# Patient Record
Sex: MALE | Race: WHITE
[De-identification: names, ages, dates, MRNs, and addresses within clinical notes are randomized per-mention and may not be internally consistent; named-entity substitution may affect disease eponyms.]

---

## 2021-02-24 ENCOUNTER — HOSPITAL ENCOUNTER (INPATIENT)
Dept: HOSPITAL 53 - M ED | Age: 36
LOS: 5 days | Discharge: HOME | DRG: 773 | End: 2021-03-01
Admitting: PSYCHIATRY & NEUROLOGY
Payer: SELF-PAY

## 2021-02-24 VITALS — HEIGHT: 66 IN | BODY MASS INDEX: 25.79 KG/M2 | WEIGHT: 160.5 LBS

## 2021-02-24 VITALS — SYSTOLIC BLOOD PRESSURE: 128 MMHG | DIASTOLIC BLOOD PRESSURE: 72 MMHG

## 2021-02-24 DIAGNOSIS — F10.10: ICD-10-CM

## 2021-02-24 DIAGNOSIS — F11.10: ICD-10-CM

## 2021-02-24 DIAGNOSIS — F14.10: ICD-10-CM

## 2021-02-24 DIAGNOSIS — F17.200: ICD-10-CM

## 2021-02-24 DIAGNOSIS — F15.10: ICD-10-CM

## 2021-02-24 DIAGNOSIS — F12.10: ICD-10-CM

## 2021-02-24 DIAGNOSIS — F19.94: Primary | ICD-10-CM

## 2021-02-24 LAB
ALBUMIN SERPL BCG-MCNC: 4.7 GM/DL (ref 3.2–5.2)
ALT SERPL W P-5'-P-CCNC: 38 U/L (ref 12–78)
AMPHETAMINES UR QL SCN: POSITIVE
APAP SERPL-MCNC: < 2 UG/ML (ref 10–30)
BARBITURATES UR QL SCN: NEGATIVE
BENZODIAZ UR QL SCN: NEGATIVE
BILIRUB CONJ SERPL-MCNC: 0.4 MG/DL (ref 0–0.2)
BILIRUB SERPL-MCNC: 1.4 MG/DL (ref 0.2–1)
BUN SERPL-MCNC: 22 MG/DL (ref 7–18)
BZE UR QL SCN: POSITIVE
CALCIUM SERPL-MCNC: 10.5 MG/DL (ref 8.5–10.1)
CANNABINOIDS UR QL SCN: POSITIVE
CHLAMYDIA DNA AMPLIFICATION: NEGATIVE
CHLORIDE SERPL-SCNC: 101 MEQ/L (ref 98–107)
CK SERPL-CCNC: 991 U/L (ref 39–308)
CO2 SERPL-SCNC: 23 MEQ/L (ref 21–32)
CREAT SERPL-MCNC: 1.25 MG/DL (ref 0.7–1.3)
ETHANOL SERPL-MCNC: < 0.003 % (ref 0–0.01)
GFR SERPL CREATININE-BSD FRML MDRD: > 60 ML/MIN/{1.73_M2} (ref 60–?)
GLUCOSE SERPL-MCNC: 72 MG/DL (ref 70–100)
HBV CORE IGM SER QL: NEGATIVE
HBV SURFACE AB SER-ACNC: NEGATIVE M[IU]/ML
HCT VFR BLD AUTO: 45.7 % (ref 42–52)
HCV AB SER QL: < 0 INDEX (ref ?–0.8)
HEPATITIS A ANTIBODY IGM: NEGATIVE
HGB BLD-MCNC: 15.2 G/DL (ref 13.5–17.5)
HIV 1+2 AB+HIV1 P24 AG SERPL QL IA: NEGATIVE
MCH RBC QN AUTO: 29.3 PG (ref 27–33)
MCHC RBC AUTO-ENTMCNC: 33.3 G/DL (ref 32–36.5)
MCV RBC AUTO: 88.1 FL (ref 80–96)
METHADONE UR QL SCN: NEGATIVE
N GONORRHOEA RRNA SPEC QL NAA+PROBE: NEGATIVE
OPIATES UR QL SCN: NEGATIVE
PCP UR QL SCN: NEGATIVE
PLATELET # BLD AUTO: 333 10^3/UL (ref 150–450)
POTASSIUM SERPL-SCNC: 4.9 MEQ/L (ref 3.5–5.1)
PROT SERPL-MCNC: 7.9 GM/DL (ref 6.4–8.2)
RBC # BLD AUTO: 5.19 10^6/UL (ref 4.3–6.1)
SALICYLATES SERPL-MCNC: 4.8 MG/DL (ref 5–30)
SODIUM SERPL-SCNC: 136 MEQ/L (ref 136–145)
TSH SERPL DL<=0.005 MIU/L-ACNC: 1.97 UIU/ML (ref 0.36–3.74)
WBC # BLD AUTO: 12.1 10^3/UL (ref 4–10)

## 2021-02-24 NOTE — CCD
Summarization Of Episode

                             Created on: 2021



TAMARJAVY CRAIG

External Reference #: [id number]

: 1985

Sex: Male



Demographics





                          Address                   [address] 

[place]  [address]

 

                          Home Phone                [phone]

 

                          Preferred Language        English

 

                          Marital Status            Single

 

                          Mandaeism Affiliation     Confucianist

 

                          Race                      White

 

                          Ethnic Group              Not  or 





Author





                          Author                    [facility]

 

                          Organization              [facility]

 

                          Address                   Unknown

 

                          Phone                     Unavailable







Support





                Name            Relationship    Address         Phone

 

                    NAMAN BOJORQUEZ          Next Of Kin         [address]

[place]  [address]                      [phone]

 

                    [facility] Next Of Kin         [address] [place]  [address]                    [phone]

 

                UNEMPLOYED      Next Of Kin     Unknown         [phone]

 

                UE              Next Of Kin     Unknown         Unavailable

 

                    ELEAZAR BRISCOE     Next Of Kin         [address]

[place]  [address]                        [phone]

 

                    ORVILLEELEAZAR FIGUEROA    Next Of Kin         [address]

[place]  [address]                        [phone]



                                  



Re-disclosure Warning

          The records that you are about to access may contain information from 
federally-assisted alcohol or drug abuse programs. If such information is 
present, then the following federally mandated warning applies: This information
has been disclosed to you from records protected by federal confidentiality 
rules (42 CFR part 2). The federal rules prohibit you from making any further 
disclosure of this information unless further disclosure is expressly permitted 
by the written consent of the person to whom it pertains or as otherwise 
permitted by 42 CFR part 2. A general authorization for the release of medical 
or other information is NOT sufficient for this purpose. The Federal rules 
restrict any use of the information to criminally investigate or prosecute any 
alcohol or drug abuse patient.The records that you are about to access may 
contain highly sensitive health information, the redisclosure of which is 
protected by Article 27-F of the [facility] Public Health law. If you 
continue you may have access to information: Regarding HIV / AIDS; Provided by 
facilities licensed or operated by the [facility] Office of Mental Health; 
or Provided by the [facility] Office for People With Developmental 
Disabilities. If such information is present, then the following [place] 
mandated warning applies: This information has been disclosed to you from 
confidential records which are protected by state law. State law prohibits you 
from making any further disclosure of this information without the specific 
written consent of the person to whom it pertains, or as otherwise permitted by 
law. Any unauthorized further disclosure in violation of state law may result in
a fine or care home sentence or both. A general authorization for the release of 
medical or other information is NOT sufficient authorization for further disc
losure.                                                          



Insurance Providers

          



             Payer name   Policy type / Coverage type Policy ID    Covered party

 ID Covered 

party's relationship to mcguire Policy Mcguire             Plan Information

 

          Pending sale to [facility]             [id number]                             [id number]

700

 

          [facility]26339E                              [id number]

 

          MEDICAID [id number]            [id number]

 

          TOTAL CARE [facility]           [id number]            SP                  [id number]

293

 

          MEDICAID            [id number]            [id number]

 

          Pending sale to [facility]             [id number]                             [id number]

 

          MEDICAID [id number]            [facility]26339E

 

          TOTAL CARE MEDICAID [id number]            [facility]26339E

 

          [facility]                      [place]                 



 

          SELF PAY  2         UNAVAILABLE                               UNAVAILA

BLE

 

          SELF PAY  2         UNAVAILABLE           1                   UNAVAILA

BLE

 

          [facility]         [id number]              1                   11

1213

 

          SELF PAY  2         UNAVAILABLE                               UNAVAILA

BLE

## 2021-02-24 NOTE — CCD
Summarization Of Episode

                             Created on: 2021



TAMARJAVY CRAIG

External Reference #: [id number]

: 1985

Sex: Male



Demographics





                          Address                   [address] 

[place]  [address]

 

                          Home Phone                [phone]

 

                          Preferred Language        English

 

                          Marital Status            Single

 

                          Catholic Affiliation     Jew

 

                          Race                      White

 

                          Ethnic Group              Not  or 





Author





                          Author                    [facility]

 

                          Organization              [facility]

 

                          Address                   Unknown

 

                          Phone                     Unavailable







Support





                Name            Relationship    Address         Phone

 

                    NAMAN BOJORQUEZ          Next Of Kin         [address]

[place]  [address]                      [phone]

 

                    [facility] Next Of Kin         [address] [place]  [address]                    [phone]

 

                UNEMPLOYED      Next Of Kin     Unknown         [phone]

 

                UE              Next Of Kin     Unknown         Unavailable

 

                    ELEAZAR BRISCOE     Next Of Kin         [address]

[place]  [address]                        [phone]

 

                    ORVILLEELEAZAR FIGUEROA    Next Of Kin         [address]

[place]  [address]                        [phone]



                                  



Re-disclosure Warning

          The records that you are about to access may contain information from 
federally-assisted alcohol or drug abuse programs. If such information is 
present, then the following federally mandated warning applies: This information
has been disclosed to you from records protected by federal confidentiality 
rules (42 CFR part 2). The federal rules prohibit you from making any further 
disclosure of this information unless further disclosure is expressly permitted 
by the written consent of the person to whom it pertains or as otherwise 
permitted by 42 CFR part 2. A general authorization for the release of medical 
or other information is NOT sufficient for this purpose. The Federal rules 
restrict any use of the information to criminally investigate or prosecute any 
alcohol or drug abuse patient.The records that you are about to access may 
contain highly sensitive health information, the redisclosure of which is 
protected by Article 27-F of the [facility] Public Health law. If you 
continue you may have access to information: Regarding HIV / AIDS; Provided by 
facilities licensed or operated by the [facility] Office of Mental Health; 
or Provided by the [facility] Office for People With Developmental 
Disabilities. If such information is present, then the following [place] 
mandated warning applies: This information has been disclosed to you from 
confidential records which are protected by state law. State law prohibits you 
from making any further disclosure of this information without the specific 
written consent of the person to whom it pertains, or as otherwise permitted by 
law. Any unauthorized further disclosure in violation of state law may result in
a fine or retirement sentence or both. A general authorization for the release of 
medical or other information is NOT sufficient authorization for further disc
losure.                                                          



Insurance Providers

          



             Payer name   Policy type / Coverage type Policy ID    Covered party

 ID Covered 

party's relationship to mcguire Policy Mcguire             Plan Information

 

          SELF PAY ONLY           [id number]           SP                  [id number]

288

 

          [facility]             [id number]           SP                  [id number]

700

 

          [facility]26339E                              [id number]

 

          MEDICAID [id number]            [id number]

 

          TOTAL CARE [facility]           [id number]            SP                  [id number]

293

 

          MEDICAID [id number]            [facility]26339E

 

          [facility]             [id number]                             [id number]

 

          MEDICAID [id number]            [id number]

 

          TOTAL CARE MEDICAID [id number]            [facility]26339E

 

          [facility]                      [place]                 



 

          SELF PAY  2         UNAVAILABLE                               UNAVAILA

BLE

 

          SELF PAY  2         UNAVAILABLE           1                   UNAVAILA

BLE

 

          [facility]         [id number]              1                   11

1213

 

          SELF PAY  2         UNAVAILABLE                               UNAVAILA

BLE

## 2021-02-24 NOTE — CCD
Summarization Of Episode

                             Created on: 2021



TAMARJAVY CRAIG

External Reference #: [id number]

: 1985

Sex: Male



Demographics





                          Address                   [address] 

[place]  [address]

 

                          Home Phone                [phone]

 

                          Preferred Language        English

 

                          Marital Status            Single

 

                          Anabaptism Affiliation     Methodist

 

                          Race                      White

 

                          Ethnic Group              Not  or 





Author





                          Author                    [facility]

 

                          Organization              [facility]

 

                          Address                   Unknown

 

                          Phone                     Unavailable







Support





                Name            Relationship    Address         Phone

 

                    NAMAN BOJORQUEZ          Next Of Kin         [address]

[place]  [address]                      [phone]

 

                    [facility] Next Of Kin         [address] [place]  [address]                    [phone]

 

                UNEMPLOYED      Next Of Kin     Unknown         [phone]

 

                UE              Next Of Kin     Unknown         Unavailable

 

                    ELEAZAR BRISCOE     Next Of Kin         [address]

[place]  [address]                        [phone]

 

                    ORVILLEELEAZAR FIGUEROA    Next Of Kin         [address]

[place]  [address]                        [phone]



                                  



Re-disclosure Warning

          The records that you are about to access may contain information from 
federally-assisted alcohol or drug abuse programs. If such information is 
present, then the following federally mandated warning applies: This information
has been disclosed to you from records protected by federal confidentiality 
rules (42 CFR part 2). The federal rules prohibit you from making any further 
disclosure of this information unless further disclosure is expressly permitted 
by the written consent of the person to whom it pertains or as otherwise 
permitted by 42 CFR part 2. A general authorization for the release of medical 
or other information is NOT sufficient for this purpose. The Federal rules 
restrict any use of the information to criminally investigate or prosecute any 
alcohol or drug abuse patient.The records that you are about to access may 
contain highly sensitive health information, the redisclosure of which is 
protected by Article 27-F of the [facility] Public Health law. If you 
continue you may have access to information: Regarding HIV / AIDS; Provided by 
facilities licensed or operated by the [facility] Office of Mental Health; 
or Provided by the [facility] Office for People With Developmental 
Disabilities. If such information is present, then the following [place] 
mandated warning applies: This information has been disclosed to you from 
confidential records which are protected by state law. State law prohibits you 
from making any further disclosure of this information without the specific 
written consent of the person to whom it pertains, or as otherwise permitted by 
law. Any unauthorized further disclosure in violation of state law may result in
a fine or detention sentence or both. A general authorization for the release of 
medical or other information is NOT sufficient authorization for further disc
losure.                                                          



Insurance Providers

          



             Payer name   Policy type / Coverage type Policy ID    Covered party

 ID Covered 

party's relationship to mcguire Policy Mcguire             Plan Information

 

          SELF PAY ONLY           [id number]           SP                  [id number]

288

 

          [facility]             [id number]           SP                  [id number]

700

 

          [facility]26339E                              [id number]

 

          MEDICAID [id number]            [id number]

 

          TOTAL CARE [facility]           [id number]            SP                  [id number]

293

 

          MEDICAID [id number]            [facility]26339E

 

          [facility]             [id number]                             [id number]

 

          MEDICAID [id number]            [facility]26339E

 

          TOTAL CARE MEDICAID [id number]            [id number]

 

          [facility]                      [place]                 



 

          SELF PAY  2         UNAVAILABLE                               UNAVAILA

BLE

 

          SELF PAY  2         UNAVAILABLE           1                   UNAVAILA

BLE

 

          [facility]         [id number]              1                   11

1213

 

          SELF PAY  2         UNAVAILABLE                               UNAVAILA

BLE

## 2021-02-24 NOTE — CCD
Summarization Of Episode

                             Created on: 2021



TAMARJAVY CRAIG

External Reference #: [id number]

: 1985

Sex: Male



Demographics





                          Address                   [address] [place]  [address]

 

                          Home Phone                [phone]

 

                          Preferred Language        English

 

                          Marital Status            Single

 

                          Buddhism Affiliation     Gnosticist

 

                          Race                      Other Race

 

                          Ethnic Group              Not  or 





Author





                          Author                    [facility]

 

                          Organization              [facility]

 

                          Address                   Unknown

 

                          Phone                     Unavailable







Support





                Name            Relationship    Address         Phone

 

                    [facility] Next Of Kin         [address] [place]  [address]                    [phone]

 

                UNEMPLOYED      Next Of Kin     Unknown         [phone]

 

                UE              Next Of Kin     Unknown         Unavailable

 

                    RACHNAELEAZAR JANSEN     Next Of Kin         [address]

[place]  [address]                        [phone]

 

                    ORVILLEELEAZAR FIGUEROA    Next Of Kin         [address]

[place]  [address]                        [phone]



                                  



Re-disclosure Warning




Insurance Providers

          



             Payer name   Policy type / Coverage type Policy ID    Covered party

 ID Covered 

party's relationship to doyle Policy Doyle             Plan Information

 

          [facility]             [id number]           SP                  [id number]

700

 

          [facility]26339E                              [id number]

 

          MEDICAID [id number]            [id number]

 

          TOTAL [facility]           [id number]            SP                  [id number]

293

 

          MEDICAID            [id number]                              [id number]

 

          [facility]             [id number]                             [id number]

 

          MEDICAID [id number]            [facility]                 [id number]

 

          TOTAL CARE MEDICAID [id number]            [id number]

 

          [facility]                      [place]                 



 

          SELF PAY  2         UNAVAILABLE                               UNAVAILA

BLE

 

          SELF PAY  2         UNAVAILABLE           1                   UNAVAILA

BLE

 

          [facility]         [id number]              1                   11

1213

 

          SELF PAY  2         UNAVAILABLE                               UNAVAILA

BLE

## 2021-02-25 VITALS — DIASTOLIC BLOOD PRESSURE: 69 MMHG | SYSTOLIC BLOOD PRESSURE: 129 MMHG

## 2021-02-25 VITALS — DIASTOLIC BLOOD PRESSURE: 86 MMHG | SYSTOLIC BLOOD PRESSURE: 140 MMHG

## 2021-02-25 LAB
ALBUMIN SERPL BCG-MCNC: 4.1 GM/DL (ref 3.2–5.2)
ALT SERPL W P-5'-P-CCNC: 38 U/L (ref 12–78)
BILIRUB SERPL-MCNC: 0.7 MG/DL (ref 0.2–1)
BUN SERPL-MCNC: 20 MG/DL (ref 7–18)
CALCIUM SERPL-MCNC: 9.2 MG/DL (ref 8.5–10.1)
CHLORIDE SERPL-SCNC: 106 MEQ/L (ref 98–107)
CK SERPL-CCNC: 226 U/L (ref 39–308)
CO2 SERPL-SCNC: 27 MEQ/L (ref 21–32)
CREAT SERPL-MCNC: 1.12 MG/DL (ref 0.7–1.3)
GFR SERPL CREATININE-BSD FRML MDRD: > 60 ML/MIN/{1.73_M2} (ref 60–?)
GLUCOSE SERPL-MCNC: 108 MG/DL (ref 70–100)
POTASSIUM SERPL-SCNC: 4.3 MEQ/L (ref 3.5–5.1)
PROT SERPL-MCNC: 6.7 GM/DL (ref 6.4–8.2)
SODIUM SERPL-SCNC: 140 MEQ/L (ref 136–145)

## 2021-02-25 RX ADMIN — MULTIPLE VITAMINS W/ MINERALS TAB SCH TAB: TAB at 15:03

## 2021-02-25 RX ADMIN — Medication SCH MG: at 15:03

## 2021-02-25 RX ADMIN — IBUPROFEN PRN MG: 400 TABLET, FILM COATED ORAL at 09:10

## 2021-02-25 RX ADMIN — NICOTINE SCH PATCH: 21 PATCH, EXTENDED RELEASE TRANSDERMAL at 15:03

## 2021-02-25 RX ADMIN — FOLIC ACID SCH MG: 1 TABLET ORAL at 15:02

## 2021-02-25 RX ADMIN — Medication SCH MG: at 21:06

## 2021-02-25 NOTE — MHHPEPDOC
General


Date Of Admission:  Feb 25, 2021


Legal Status:  9.39


Chief Complaint


People are whispering about me. People may want to push me off the roof





History of Present Illness


HISTORY OF THE PRESENT ILLNESS: Patient is a 35 -year-old , male, who 

states that people are whispering about him and may want to push him off the 

roof. Patient has a long history of substance abuse. Patient has been in and out

of [facility] for over 15 years for numerous charges. States that when he applies 

for interviews of people look him up in terms of his record and he is unable to 

hold a job. He states an past other employees want to throw him off the roof. He

states there are cars chasing him. He states he was at a friend's house and was 

asked to leave because of things they were saying about me.. He states he has 

had poor sleep and racing thoughts. He states he has had no outpatient care for 

2 years. He is presently working in [employer]. He states he has had numerous 

charges against him, including probation violations, burglary DWIs, drug use. 

Not reporting for probation. He has used cocaine, crack, heroin, and jovi.. He 

states he has had no previous psychiatric hospitalizations, but has had 

outpatient care, but he does not know his therapist. He states in the past. He 

has been on Seroquel, Remeron and Vistaril but is not on any medications now. 

His surgical history is negative. His most recent legal problem has been driving

without a license and he has a court date on March 8. He has not been  

but has a 19-year-old son he has never seen.. He has a GED. He states he has 

suicidal thoughts but has never made suicidal attempts and is concerned about 

having sexually transmitted diseases





Psychiatric Review of Systems


Depression (2 or more weeks):  depressed mood, appetite changes, suicidal 

thoughts


Psychosis:  auditory hallucination


PTSD:  denies


Anxiety:  denies


Anxiety/ 6 months or more of:  sleep disturbance





Past Psychiatric History


Previous Psychiatric Diagnosis: Patient has been treated with Seroquel Remeron 

and Vistaril. Diagnosis unknown.


Previous Psychiatric Admissions: No previous admissions.


Suicide Attempts:. States no previous suicide attempts.


Psychiatric Follow-up:. Has had follow-up appointments, but unclear if patient 

has been compliant.


Psychiatric medications:. No recent medications, but past use of Vistaril, 

Remeron and Seroquel.





Past Medical History


Medical Problems


No recent medical problem


Head Injury:  No


Seizures:  No


Hospitalizations:  No


Surgeries:  No





Family Medical/Psychiatric HX


Medical Problems


Family history not yet clarified


Psychiatric Disorders:  No


Addiction:  No


Suicide Attemps/Completions:  No





Addiction History


alcohol, cocaine, ecstasy, amphetamines, opioids, methamphetamines, heroin





Social History


Childhood: No information.


Abuse/Trauma:. No information.


Current Living Situation: Lives with his mother and occasionally his employer.


Education: GED.


Employment: [employer].


Social Support:. Mother.


Legal: Extensive legal history with most recent for driving without license.


Marital:, Not .





Mental Status Examination


General Appearance:  unkempt


Build:  average


Demeanor:  average


Eye Contact:  average


Activity:  average


Behavior:  cooperative


Speech:  clear


Mood:  depressed


Mood


Depressed and fearful


Thought Process:  depressed, slow


Thought Content (Delusions):  persecutory, paranoia, delusions


Thought Content (Other):  internal-stimuli


Thought Content (Aggressive):  none reported


Perception (Hallucinations):  auditory, visual


Perception (Other):  none reported


Cognition (Impairment of):  none reported


Cognition(Intelligence Est.):  average


Oriented:  Awake, Alert, Oriented times three


Insight:  poor


Judgment:  Poor


Psychosis:  Psychotic Perceptions





Diagnoses


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





A-FIB/CHADSVASC


A-FIB History


Current/History of A-Fib/PAF?:  No


Current PO Anticoag Therapy:  No





Age/Risk Factor Scoring


CHADSVASC:  








CHADSVASC Response (Comments) Value


 


Age Risk Factor Age < 65 years old 0


 


Gender Risk Factor Male 0


 


Hx of CHF No 0


 


Hx of HTN No 0


 


Hx of Stroke/TIA/or VTE No 0


 


Hx of Diabetes No 0


 


Hx of Vascular Disease No 0


 


Total  0











Treatment


Treatment ordered:  NONE





Initial Treatment Plan


1. Patient was admitted on a [9.39] status.


2. Complete history was obtained.


3. With patients permission, family will be contacted and database will be 

expanded. 


4. Patients medication regimen will be reviewed and changed accordingly. 


5. Patient will be provided with protected environment. 


6. Patient will be treated with individual, group, and milieu therapies. 


7. Patient will receive supportive psych-education.


8. Discharge planning will commence immediately.


9. Outpatient follow-up treatment will be strongly recommended.


10. The initial treatment plan will focus initially on:


* Depression.


* Risk for suicide.





ESTIMATED LENGTH OF STAY: - DAYS.





TIME SPENT COUNSELING AND COORDINATING INITIAL CARE:  minutes.





Vital Signs





Vital Signs








  Date Time  Temp Pulse Resp B/P (MAP) Pulse Ox O2 Delivery O2 Flow Rate FiO2


 


2/25/21 06:03 97.9 61 18 129/69 (89) 98 Room Air  











Laboratory Data


24H Labs


Laboratory Tests 2


2/24/21 13:14: Coronavirus (COVID-19)(PCR) NEGATIVE





Medications


No Active Prescriptions or Reported Meds





Allergies


Coded Allergies:  


     No Known Allergies (Unverified , 2/24/21)











AVA OCHOA MD            Feb 25, 2021 10:55

## 2021-02-25 NOTE — HPEPDOC
[facility] Medical History & Physical


Date of Admission


Feb 24, 2021


Date of Service:  Feb 25, 2021


Attending Physician:  Minna Phelps MD





History and Physical


MEDICAL H&P





HISTORY OF PRESENT ILLNESS: 


Patient is a 35-year-old male with PMH of polysubstance abuse (amphetamines, 

cocaine, cannabinoids, opiates, alcohol) , ADHD, depression, bipolar disorder 

who was admitted to [facility] of [facility] on 02/25/2021 with the chief diagnosis of psychotic 

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 The patient presented to the emergency 

room complaining of increased paranoia, auditory hallucinations which have been 

going on for 2 years. The patient states he's been using drugs to help calm 

these hallucinations but this has only made his situation worse. He has 

difficulty concentrating, insomnia, poor appetite, decreased energy and feels 

hopeless. He denied suicidal or homicidal ideation or self-harm. He states he 

currently has multiple life stressors, has been in and out of [facility] over the past

several years and has trouble with substance abuse. He states he does not have a

good support system within his [place] of friends who also use drugs. He used to 

follow with behavioral health as outpatient but he is not in some time. He also 

does not have a primary care provider to follow up with in the community. UDS 

was positive for amphetamines, cocaine and cannabinoids. Creatinine was within 

normal limits. Approaching the higher limits of normal, CK elevated at 991. 

There was no documented seizures prior to coming to the ER. The patient was 

admitted under [facility] for psychotic disorder secondary to substance abuse.





Medicine team was consulted on 02/25/2021 to further evaluate. The patient was 

borderline tearful in telling me his issues as listed above. CK improved and 

creatinine was within normal limits. Bili was also improved compared to 

admission. He admits to having at times muscle aches but denies chest pains, 

shyness of breath, lightheadedness, dizziness, headaches. He claims to feel 

better since being in the hospital. He was a big concerned as a sexual partner 

told him recently to be "checked" for STD due to positive test on her end. All 

STDs thus far are neg here. He denies dysuria, rashes, hematuria, polyuria. 





REVIEW OF SYSTEMS: Neg except for what is mentioned above 





PAST MEDICAL HISTORY: 


Polysubstance abuse (amphetamines, cocaine, cannabinoids, opiates, alcohol) 


ADHD


depression


bipolar disorder





PAST SURGICAL HISTORY: 


none 





FAMILY HISTORY: 


Father: Does not know biological father 


Mother: emphysema. Alive 





SOCIAL HISTORY: 


Smokes 1 pack per day and has done so for 20 years. He also admits to smoking 

marijuana. He also is a 20 year history of drug abuse with the aforementioned 

drugs. He takes alcohol 612 beers per night but not consistently. Lives at 

various residences as he does not have a permanent residence of his own. He is a

full code





ALLERGIES: 


Please see below. 





HOME MEDICATIONS: 


Please see below.





PHYSICAL EXAMINATION: 


VS: Please see below 


CONSTITUTIONAL: No acute distress,  AAO x 3


EYES: PERRLA, EOM intact


HENT, MOUTH: Normocephalic, atraumatic, moist mucous membranes, 


NECK: SUPPLE, no JVD, no lymphadenopathy, no carotid bruit


CV: Regular rate and rhythm, S1S2 normal, no murmurs/rubs/gallops


RESPIRATORY: Clear to auscultation bilaterally, no rales/rhonchi/wheezes


GI:  BS positive in 4 quadrants, soft, nontender, nondistended, no rebound or 

guarding, no organomegaly


: Deferred


MUSCULOSKELETAL: Normal ROM. No cyanosis, clubbing, swelling, joint deformity, 

extremity edema


INTEGUMENTARY:  Multiple tattoos on body, intact, no rashes, no lesions, no 

erythema


NEUROLOGIC: Cranial Nerves II-XII are intact, no focal deficits


PSYCHIATRIC: Slightly agitated mood 





LABORATORY DATA: 


Please see below 





IMAGING: 


None 





ASSESSMENT: 35-year-old male with PMH of polysubstance abuse (amphetamines, 

cocaine, cannabinoids, opiates, alcohol) , ADHD, depression, bipolar disorder 

admitted to [facility] for psychotic disorder 2/2 to substance abuse. 





PLAN:  





Psychotic disorder 2/2 to polysubstance abuse


-Plan per psychiatry 





Hx of depression / ADHD / bipolar disorder


-Plan per psych 





Elevated CK


-Cr wnl, CK improving


-Encourage fluids Q2H while awake 





Tobacco use 


-Nicotine patch 





Alcohol abuse


-No s/s of withdrawl 


-CIWA protocol started 








DISPOSITION: Thank you kindly for this consult. At this will sign off on patient

but if needed again, please do not hesitate to call us at any time.





Vital Signs





Vital Signs








  Date Time  Temp Pulse Resp B/P (MAP) Pulse Ox O2 Delivery O2 Flow Rate FiO2


 


2/25/21 06:03 97.9 61 18 129/69 (89) 98 Room Air  











Laboratory Data


Labs 24H


Laboratory Tests 2


2/25/21 10:46: 


Anion Gap 7L, Glomerular Filtration Rate > 60.0, Calcium Level 9.2, Total 

Bilirubin 0.7, Aspartate Amino Transf (AST/SGOT) 31, Alanine Aminotransferase 

(ALT/SGPT) 38, Alkaline Phosphatase 104, Total Creatine Kinase 226#, Total 

Protein 6.7, Albumin 4.1, Albumin/Globulin Ratio 1.6


CBC/BMP


Laboratory Tests


2/25/21 10:46











Home Medications


No Active Prescriptions or Reported Meds





Allergies


Coded Allergies:  


     No Known Allergies (Unverified , 2/24/21)





A-FIB/CHADSVASC


A-FIB History


Current/History of A-Fib/PAF?:  No


Current PO Anticoag Therapy:  No





Age/Risk Factor Scoring


CHADSVASC:  








CHADSVASC Response (Comments) Value


 


Age Risk Factor Age < 65 years old 0


 


Gender Risk Factor Male 0


 


Hx of CHF No 0


 


Hx of HTN No 0


 


Hx of Stroke/TIA/or VTE No 0


 


Hx of Diabetes No 0


 


Hx of Vascular Disease No 0


 


Total  0











Treatment


Treatment ordered:  NONE


Other anticoagulant ordered:  none











Minna Phelps MD            Feb 25, 2021 14:27

## 2021-02-26 VITALS — SYSTOLIC BLOOD PRESSURE: 104 MMHG | DIASTOLIC BLOOD PRESSURE: 58 MMHG

## 2021-02-26 VITALS — DIASTOLIC BLOOD PRESSURE: 75 MMHG | SYSTOLIC BLOOD PRESSURE: 141 MMHG

## 2021-02-26 RX ADMIN — NICOTINE SCH PATCH: 21 PATCH, EXTENDED RELEASE TRANSDERMAL at 09:30

## 2021-02-26 RX ADMIN — Medication SCH MG: at 20:37

## 2021-02-26 RX ADMIN — Medication SCH MG: at 09:29

## 2021-02-26 RX ADMIN — MULTIPLE VITAMINS W/ MINERALS TAB SCH TAB: TAB at 09:31

## 2021-02-26 RX ADMIN — FOLIC ACID SCH MG: 1 TABLET ORAL at 09:29

## 2021-02-26 RX ADMIN — IBUPROFEN PRN MG: 400 TABLET, FILM COATED ORAL at 09:29

## 2021-02-26 NOTE — MHIPNPDOC
[facility] Progress Note


Progress Note


DATE OF SERVICE: 2/26/21





HISTORY: 35-year-old male with extensive drug use was admitted in paranoid 

psychotic state.





VITAL SIGNS: See below.





NEW TEST RESULTS:, Non-.





CURRENT MEDICATIONS: See below.





MENTAL STATUS EXAMINATION:


Patient is a 35-year old male, who is clearing today and decreased paranoia.


Speech: Is. Normal.


Language skills are intact.


Thought processes including: Agreeing that he had become psychotic using drugs.


Thought content: As above. Abstract reasoning, and computation: Able to 

abstract. Description of associations:. No loose association.


Description of abnormal or psychotic thoughts: No psychotic thoughts expressed 

today.


Judgment: Improving.


Insight: Fair.


Orientation: 3.


Recent and remote memory: Improved.


Attention span and concentration: Apparently normal.


Language:. No disturbance.


Fund of knowledge: Full.


Mood: Euthymic. Affect:, Flat.





DIAGNOSES:


1., Polysubstance abuse.


2., Psychosis secondary to polysubstance abuse.


3. None.


 


ASSESSMENT:, As above





MANAGEMENT PLAN:, Further observation, started, Seroquel.





TIME SPENT: 30 minutes.





Vital Signs





Vital Signs








  Date Time  Temp Pulse Resp B/P (MAP) Pulse Ox O2 Delivery O2 Flow Rate FiO2


 


2/26/21 06:09 98.1 61 20 104/58 (73) 95 Room Air  











Current Medications





Current Medications








 Medications


  (Trade)  Dose


 Ordered  Sig/Lina


 Route


 PRN Reason  Start Time


 Stop Time Status Last Admin


Dose Admin


 


 Al Hydrox/Mg


 Hydrox/Simethicone


  (Mylanta)  30 ml  Q4HP  PRN


 PO


 HEARTBURN/INDIGESTION  2/24/21 13:20


     





 


 Folic Acid


  (Folic Acid)  1 mg  DAILY


 PO


   2/25/21 09:00


    2/26/21 09:29





 


 Home Med


  (Med Rec


 Complete!)    ASDIRECTED


 XX


   2/24/21 13:20


 2/24/21 13:20 DC  





 


 Ibuprofen


  (Advil)  400 mg  Q6HP  PRN


 PO


 PAIN  2/24/21 13:20


    2/26/21 09:29





 


 Lorazepam


  (Ativan)  1 mg  STAT  STAT


 PO


   2/24/21 06:30


 2/24/21 06:31 DC 2/24/21 06:38





 


 Lorazepam


  (Ativan)  2 mg  ASDIRECTED  PRN


 PO


 SEE PROTOCOL  2/25/21 14:30


     





 


 Magnesium


 Hydroxide


  (Milk Of


 Magnesia)  30 ml  DAILYPRN  PRN


 PO


 CONSTIPATION  2/24/21 13:20


     





 


 Multivitamins


  (Theragram-M)  1 tab  DAILY


 PO


   2/25/21 09:00


    2/26/21 09:31





 


 Nicotine


  (Nicoderm Cq


 21mg)  1 patch  DAILY


 TD


   2/25/21 15:00


    2/26/21 09:30





 


 Thiamine HCl


  (Thiamine HCl)  100 mg  BID


 PO


   2/25/21 09:00


 2/27/21 21:01  2/26/21 09:29





 


 Trazodone HCl


  (Desyrel)  50 mg  QHSP  PRN


 PO


 INSOMNIA  2/24/21 13:20


    2/25/21 21:06














Allergies


Coded Allergies:  


     No Known Allergies (Unverified , 2/24/21)











AVA OCHOA MD            Feb 26, 2021 14:52

## 2021-02-27 VITALS — DIASTOLIC BLOOD PRESSURE: 52 MMHG | SYSTOLIC BLOOD PRESSURE: 98 MMHG

## 2021-02-27 VITALS — SYSTOLIC BLOOD PRESSURE: 140 MMHG | DIASTOLIC BLOOD PRESSURE: 85 MMHG

## 2021-02-27 RX ADMIN — Medication SCH MG: at 09:39

## 2021-02-27 RX ADMIN — IBUPROFEN PRN MG: 400 TABLET, FILM COATED ORAL at 16:49

## 2021-02-27 RX ADMIN — Medication SCH MG: at 21:02

## 2021-02-27 RX ADMIN — MULTIPLE VITAMINS W/ MINERALS TAB SCH TAB: TAB at 09:39

## 2021-02-27 RX ADMIN — NICOTINE SCH PATCH: 21 PATCH, EXTENDED RELEASE TRANSDERMAL at 09:40

## 2021-02-27 RX ADMIN — FOLIC ACID SCH MG: 1 TABLET ORAL at 09:39

## 2021-02-27 NOTE — MHIPNPDOC
[facility] Progress Note


Progress Note


DATE OF SERVICE: 2/27/21





HISTORY: 35-year-old male admitted with psychosis due to drug abuse.





VITAL SIGNS: See below.





NEW TEST RESULTS: None.





CURRENT MEDICATIONS: See below.





MENTAL STATUS EXAMINATION:


Patient is a 35-year old male, who is in improved Mood and thoughts are clear.


Speech: Is normal.


Language skills are. No disturbance.


Thought processes including: Planning on rehabilitation.


Thought content: Planning on rehabilitation and stopping of drugs. Abstract 

reasoning, and computation: Able to abstract. Description of associations:. No 

disturbance.


Description of abnormal or psychotic thoughts:. No abnormal or psychotic 

thought.


Judgment: Improved.


Insight:. Fair.


Orientation: 3.


Recent and remote memory: Intact.


Attention span and concentration: Intact.


Language:. No disturbance.


Fund of knowledge: Reasonable.


Mood: Good. Affect: Congruent.





DIAGNOSES:


1. Psychosis secondary to drug abuse.


2. None.


3. None.


 


ASSESSMENT: As above





MANAGEMENT PLAN:. Outpatient planning to ensue.





TIME SPENT: 30 minutes.





Vital Signs





Vital Signs








  Date Time  Temp Pulse Resp B/P (MAP) Pulse Ox O2 Delivery O2 Flow Rate FiO2


 


2/27/21 08:10  59  98/52    


 


2/27/21 06:28 98.4  14  99 Room Air  











Current Medications





Current Medications








 Medications


  (Trade)  Dose


 Ordered  Sig/Lina


 Route


 PRN Reason  Start Time


 Stop Time Status Last Admin


Dose Admin


 


 Al Hydrox/Mg


 Hydrox/Simethicone


  (Mylanta)  30 ml  Q4HP  PRN


 PO


 HEARTBURN/INDIGESTION  2/24/21 13:20


     





 


 Folic Acid


  (Folic Acid)  1 mg  DAILY


 PO


   2/25/21 09:00


    2/27/21 09:39





 


 Home Med


  (Med Rec


 Complete!)    ASDIRECTED


 XX


   2/24/21 13:20


 2/24/21 13:20 DC  





 


 Ibuprofen


  (Advil)  400 mg  Q6HP  PRN


 PO


 PAIN  2/24/21 13:20


    2/26/21 09:29





 


 Lorazepam


  (Ativan)  1 mg  STAT  STAT


 PO


   2/24/21 06:30


 2/24/21 06:31 DC 2/24/21 06:38





 


 Lorazepam


  (Ativan)  2 mg  ASDIRECTED  PRN


 PO


 SEE PROTOCOL  2/25/21 14:30


     





 


 Magnesium


 Hydroxide


  (Milk Of


 Magnesia)  30 ml  DAILYPRN  PRN


 PO


 CONSTIPATION  2/24/21 13:20


     





 


 Multivitamins


  (Theragram-M)  1 tab  DAILY


 PO


   2/25/21 09:00


    2/27/21 09:39





 


 Nicotine


  (Nicoderm Cq


 21mg)  1 patch  DAILY


 TD


   2/25/21 15:00


    2/27/21 09:40





 


 Quetiapine


 Fumarate


  (SEROquel)  100 mg  QHS


 PO


   2/26/21 21:00


    2/26/21 20:37





 


 Thiamine HCl


  (Thiamine HCl)  100 mg  BID


 PO


   2/25/21 09:00


 2/27/21 21:01  2/27/21 09:39





 


 Trazodone HCl


  (Desyrel)  50 mg  QHSP  PRN


 PO


 INSOMNIA  2/24/21 13:20


    2/25/21 21:06














Allergies


Coded Allergies:  


     No Known Allergies (Unverified , 2/24/21)











AVA OCHOA MD            Feb 27, 2021 16:12

## 2021-02-28 VITALS — SYSTOLIC BLOOD PRESSURE: 118 MMHG | DIASTOLIC BLOOD PRESSURE: 67 MMHG

## 2021-02-28 VITALS — SYSTOLIC BLOOD PRESSURE: 129 MMHG | DIASTOLIC BLOOD PRESSURE: 75 MMHG

## 2021-02-28 RX ADMIN — FOLIC ACID SCH MG: 1 TABLET ORAL at 08:55

## 2021-02-28 RX ADMIN — IBUPROFEN PRN MG: 400 TABLET, FILM COATED ORAL at 20:52

## 2021-02-28 RX ADMIN — NICOTINE SCH PATCH: 21 PATCH, EXTENDED RELEASE TRANSDERMAL at 08:55

## 2021-02-28 RX ADMIN — IBUPROFEN PRN MG: 400 TABLET, FILM COATED ORAL at 08:56

## 2021-02-28 RX ADMIN — MULTIPLE VITAMINS W/ MINERALS TAB SCH TAB: TAB at 08:55

## 2021-02-28 NOTE — MHIPNPDOC
[facility] Progress Note


Progress Note


DATE OF SERVICE: 2/28/21





HISTORY: Substance abuse and paranoia brought this patient to the hospital.





VITAL SIGNS: See below.





NEW TEST RESULTS: None





CURRENT MEDICATIONS: See below.





MENTAL STATUS EXAMINATION:


Patient is a 35-year old male, who is. Presently clear.


Speech: Is. No disturbance of speech.


Language skills are intact.


Thought processes including:. No disturbance.


Thought content: Planning rehabilitation. Abstract reasoning, and computation: 

Able to abstract. Description of associations:. No loose association.


Description of abnormal or psychotic thoughts:. No psychotic thought. At this 

time.


Judgment:. Improved.


Insight: Good.


Orientation: 3.


Recent and remote memory: Intact.


Attention span and concentration:. No disturbance.


Language: Intact.


Fund of knowledge:. Reasonable.


Mood: Good. Affect: Congruent





DIAGNOSES:


1.. Substance abuse.


2.. Paranoia secondary to substance abuse.


3. None.


 


ASSESSMENT:, As above





MANAGEMENT PLAN:, Discharge planning and rehabilitation and alcohol treatment.





TIME SPENT:, 30 minutes.





Vital Signs





Vital Signs








  Date Time  Temp Pulse Resp B/P (MAP) Pulse Ox O2 Delivery O2 Flow Rate FiO2


 


2/28/21 06:50 98.2 54 18 118/67 (84) 99 Room Air  











Current Medications





Current Medications








 Medications


  (Trade)  Dose


 Ordered  Sig/Lina


 Route


 PRN Reason  Start Time


 Stop Time Status Last Admin


Dose Admin


 


 Al Hydrox/Mg


 Hydrox/Simethicone


  (Mylanta)  30 ml  Q4HP  PRN


 PO


 HEARTBURN/INDIGESTION  2/24/21 13:20


     





 


 Folic Acid


  (Folic Acid)  1 mg  DAILY


 PO


   2/25/21 09:00


    2/28/21 08:55





 


 Home Med


  (Med Rec


 Complete!)    ASDIRECTED


 XX


   2/24/21 13:20


 2/24/21 13:20 DC  





 


 Ibuprofen


  (Advil)  400 mg  Q6HP  PRN


 PO


 PAIN  2/24/21 13:20


    2/28/21 08:56





 


 Lorazepam


  (Ativan)  1 mg  STAT  STAT


 PO


   2/24/21 06:30


 2/24/21 06:31 DC 2/24/21 06:38





 


 Lorazepam


  (Ativan)  2 mg  ASDIRECTED  PRN


 PO


 SEE PROTOCOL  2/25/21 14:30


 2/28/21 07:48 DC  





 


 Magnesium


 Hydroxide


  (Milk Of


 Magnesia)  30 ml  DAILYPRN  PRN


 PO


 CONSTIPATION  2/24/21 13:20


     





 


 Multivitamins


  (Theragram-M)  1 tab  DAILY


 PO


   2/25/21 09:00


    2/28/21 08:55





 


 Nicotine


  (Nicoderm Cq


 21mg)  1 patch  DAILY


 TD


   2/25/21 15:00


    2/28/21 08:55





 


 Quetiapine


 Fumarate


  (SEROquel)  100 mg  QHS


 PO


   2/26/21 21:00


    2/27/21 21:02





 


 Thiamine HCl


  (Thiamine HCl)  100 mg  BID


 PO


   2/25/21 09:00


 2/27/21 21:01 DC 2/27/21 21:02





 


 Trazodone HCl


  (Desyrel)  50 mg  QHSP  PRN


 PO


 INSOMNIA  2/24/21 13:20


    2/27/21 21:02














Allergies


Coded Allergies:  


     No Known Allergies (Unverified , 2/24/21)











AVA OCHOA MD            Feb 28, 2021 12:57

## 2021-03-01 VITALS — DIASTOLIC BLOOD PRESSURE: 60 MMHG | SYSTOLIC BLOOD PRESSURE: 111 MMHG

## 2021-03-01 RX ADMIN — NICOTINE SCH PATCH: 21 PATCH, EXTENDED RELEASE TRANSDERMAL at 09:00

## 2021-03-01 RX ADMIN — MULTIPLE VITAMINS W/ MINERALS TAB SCH TAB: TAB at 09:50

## 2021-03-01 RX ADMIN — FOLIC ACID SCH MG: 1 TABLET ORAL at 09:50

## 2021-03-01 NOTE — MHDSPDOC
[facility] Discharge Summary


Discharge Summary


DATE OF ADMISSION: Feb 24, 2021 at 16:53 


DATE OF DISCHARGE: Mar 1, 2021 at 10:47





DISCHARGE DIAGNOSES:


1. Psychosis secondary to polysubstance abuse.


2.





REASON FOR ADMISSION: Paranoia following use of multiple substances





CONSULTANTS INVOLVED: None





TREATMENT AND PROGRESS ON THE UNIT :. Patient became clear, less paranoid, more 

coherent.





HOSPITAL COURSE: As above





DISCHARGE ASSESSMENT:, Patient is a polysubstance abuser and may continue to 

have difficulties though he claims he will again try sobriety





MENTAL STATUS EXAMINATION ON DISCHARGE: 


Patient is a -year old male, who is .


Speech is .


Language skills are .


Thought processes including: . 


Thought content: .


Abstract reasoning, and computation: .


Description of associations: .


Description of abnormal or psychotic thoughts: .


Judgment: .


Insight: .


Orientation to .


Recent and remote memory: .


Attention span and concentration: .


Language: .


Fund of knowledge: .


Mood: .


Affect: .





MEDICATIONS ON DISCHARGE:


-  for .





PLAN/FOLLOWUP ARRANGEMENTS: .





The amount of time spent in the coordination of care for this patient was 

approximately  minutes.





ETOH/Disorder Med Rx


ETOH/DRUG DISORDER RX:  Given to pt at d/c





Vital Signs/I&Os





Vital Signs








  Date Time  Temp Pulse Resp B/P (MAP) Pulse Ox O2 Delivery O2 Flow Rate FiO2


 


3/1/21 06:28 97.6 51 20 111/60 (77) 99 Room Air  











Laboratory Data


Labs 24H


Positive toxicology screen





Medications


Scheduled


Quetiapine Fumarate (Quetiapine Fumarate) 100 Mg Tablet, 100 MG PO QHS for 

Anxiety Sleep, #10





Allergies


Coded Allergies:  


     No Known Allergies (Unverified , 2/24/21)











AVA OCHOA MD             Mar 1, 2021 16:27

## 2022-02-10 ENCOUNTER — HOSPITAL ENCOUNTER (EMERGENCY)
Dept: HOSPITAL 53 - M ED | Age: 37
Discharge: HOME | End: 2022-02-10
Payer: COMMERCIAL

## 2022-02-10 VITALS — DIASTOLIC BLOOD PRESSURE: 82 MMHG | SYSTOLIC BLOOD PRESSURE: 143 MMHG

## 2022-02-10 VITALS — HEIGHT: 67 IN | WEIGHT: 140.3 LBS | BODY MASS INDEX: 22.02 KG/M2

## 2022-02-10 DIAGNOSIS — F19.129: Primary | ICD-10-CM

## 2022-02-10 DIAGNOSIS — Z79.899: ICD-10-CM

## 2022-02-10 LAB
ALBUMIN SERPL BCG-MCNC: 3.4 GM/DL (ref 3.2–5.2)
ALT SERPL W P-5'-P-CCNC: 20 U/L (ref 12–78)
AMPHETAMINES UR QL SCN: NEGATIVE
APAP SERPL-MCNC: < 2 UG/ML (ref 10–30)
BARBITURATES UR QL SCN: NEGATIVE
BENZODIAZ UR QL SCN: NEGATIVE
BILIRUB CONJ SERPL-MCNC: < 0.1 MG/DL (ref 0–0.2)
BILIRUB SERPL-MCNC: < 0.1 MG/DL (ref 0.2–1)
BUN SERPL-MCNC: 16 MG/DL (ref 7–18)
BZE UR QL SCN: NEGATIVE
CALCIUM SERPL-MCNC: 9.1 MG/DL (ref 8.5–10.1)
CANNABINOIDS UR QL SCN: POSITIVE
CHLORIDE SERPL-SCNC: 109 MEQ/L (ref 98–107)
CO2 SERPL-SCNC: 31 MEQ/L (ref 21–32)
CREAT SERPL-MCNC: 0.87 MG/DL (ref 0.7–1.3)
ETHANOL SERPL-MCNC: < 0.003 % (ref 0–0.01)
GFR SERPL CREATININE-BSD FRML MDRD: > 60 ML/MIN/{1.73_M2} (ref 60–?)
GLUCOSE SERPL-MCNC: 85 MG/DL (ref 70–100)
HCT VFR BLD AUTO: 44.9 % (ref 42–52)
HGB BLD-MCNC: 14.7 G/DL (ref 13.5–17.5)
MCH RBC QN AUTO: 30.1 PG (ref 27–33)
MCHC RBC AUTO-ENTMCNC: 32.7 G/DL (ref 32–36.5)
MCV RBC AUTO: 91.8 FL (ref 80–96)
METHADONE UR QL SCN: NEGATIVE
OPIATES UR QL SCN: NEGATIVE
PCP UR QL SCN: NEGATIVE
PLATELET # BLD AUTO: 387 10^3/UL (ref 150–450)
POTASSIUM SERPL-SCNC: 4.6 MEQ/L (ref 3.5–5.1)
PROT SERPL-MCNC: 6.2 GM/DL (ref 6.4–8.2)
RBC # BLD AUTO: 4.89 10^6/UL (ref 4.3–6.1)
SALICYLATES SERPL-MCNC: 4 MG/DL (ref 5–30)
SODIUM SERPL-SCNC: 141 MEQ/L (ref 136–145)
TSH SERPL DL<=0.005 MIU/L-ACNC: 1.51 UIU/ML (ref 0.36–3.74)
WBC # BLD AUTO: 8.5 10^3/UL (ref 4–10)

## 2022-03-29 ENCOUNTER — HOSPITAL ENCOUNTER (INPATIENT)
Dept: HOSPITAL 53 - M ED | Age: 37
LOS: 3 days | Discharge: HOME | DRG: 753 | End: 2022-04-01
Attending: STUDENT IN AN ORGANIZED HEALTH CARE EDUCATION/TRAINING PROGRAM | Admitting: STUDENT IN AN ORGANIZED HEALTH CARE EDUCATION/TRAINING PROGRAM
Payer: COMMERCIAL

## 2022-03-29 VITALS — HEIGHT: 66 IN | WEIGHT: 140.21 LBS | BODY MASS INDEX: 22.53 KG/M2

## 2022-03-29 VITALS — SYSTOLIC BLOOD PRESSURE: 128 MMHG | DIASTOLIC BLOOD PRESSURE: 74 MMHG

## 2022-03-29 DIAGNOSIS — F19.10: ICD-10-CM

## 2022-03-29 DIAGNOSIS — F17.200: ICD-10-CM

## 2022-03-29 DIAGNOSIS — R45.850: ICD-10-CM

## 2022-03-29 DIAGNOSIS — Z20.822: ICD-10-CM

## 2022-03-29 DIAGNOSIS — F60.89: ICD-10-CM

## 2022-03-29 DIAGNOSIS — R45.851: ICD-10-CM

## 2022-03-29 DIAGNOSIS — F32.89: Primary | ICD-10-CM

## 2022-03-29 DIAGNOSIS — Z63.0: ICD-10-CM

## 2022-03-29 DIAGNOSIS — Z59.00: ICD-10-CM

## 2022-03-29 LAB
ALBUMIN SERPL BCG-MCNC: 3.7 GM/DL (ref 3.2–5.2)
ALT SERPL W P-5'-P-CCNC: 20 U/L (ref 12–78)
AMPHETAMINES UR QL SCN: NEGATIVE
APAP SERPL-MCNC: < 2 UG/ML (ref 10–30)
BARBITURATES UR QL SCN: NEGATIVE
BENZODIAZ UR QL SCN: NEGATIVE
BILIRUB CONJ SERPL-MCNC: < 0.1 MG/DL (ref 0–0.2)
BILIRUB SERPL-MCNC: 0.1 MG/DL (ref 0.2–1)
BUN SERPL-MCNC: 11 MG/DL (ref 7–18)
BZE UR QL SCN: NEGATIVE
CALCIUM SERPL-MCNC: 9.1 MG/DL (ref 8.5–10.1)
CANNABINOIDS UR QL SCN: POSITIVE
CHLORIDE SERPL-SCNC: 109 MEQ/L (ref 98–107)
CO2 SERPL-SCNC: 32 MEQ/L (ref 21–32)
CREAT SERPL-MCNC: 0.95 MG/DL (ref 0.7–1.3)
ETHANOL SERPL-MCNC: < 0.003 % (ref 0–0.01)
GFR SERPL CREATININE-BSD FRML MDRD: > 60 ML/MIN/{1.73_M2} (ref 60–?)
GLUCOSE SERPL-MCNC: 84 MG/DL (ref 70–100)
HCT VFR BLD AUTO: 43.5 % (ref 42–52)
HGB BLD-MCNC: 14.7 G/DL (ref 13.5–17.5)
MCH RBC QN AUTO: 30.2 PG (ref 27–33)
MCHC RBC AUTO-ENTMCNC: 33.8 G/DL (ref 32–36.5)
MCV RBC AUTO: 89.3 FL (ref 80–96)
METHADONE UR QL SCN: NEGATIVE
OPIATES UR QL SCN: NEGATIVE
PCP UR QL SCN: NEGATIVE
PLATELET # BLD AUTO: 316 10^3/UL (ref 150–450)
POTASSIUM SERPL-SCNC: 4.4 MEQ/L (ref 3.5–5.1)
PROT SERPL-MCNC: 6.5 GM/DL (ref 6.4–8.2)
RBC # BLD AUTO: 4.87 10^6/UL (ref 4.3–6.1)
RSV RNA NPH QL NAA+PROBE: NEGATIVE
SALICYLATES SERPL-MCNC: 4 MG/DL (ref 5–30)
SODIUM SERPL-SCNC: 143 MEQ/L (ref 136–145)
TSH SERPL DL<=0.005 MIU/L-ACNC: 2.03 UIU/ML (ref 0.36–3.74)
WBC # BLD AUTO: 10 10^3/UL (ref 4–10)

## 2022-03-29 SDOH — SOCIAL STABILITY - SOCIAL INSECURITY: PROBLEMS IN RELATIONSHIP WITH SPOUSE OR PARTNER: Z63.0

## 2022-03-29 SDOH — ECONOMIC STABILITY - HOUSING INSECURITY: HOMELESSNESS UNSPECIFIED: Z59.00

## 2022-03-30 VITALS — SYSTOLIC BLOOD PRESSURE: 108 MMHG | DIASTOLIC BLOOD PRESSURE: 64 MMHG

## 2022-03-30 RX ADMIN — VENLAFAXINE HYDROCHLORIDE SCH MG: 37.5 CAPSULE, EXTENDED RELEASE ORAL at 09:00

## 2022-03-30 RX ADMIN — NICOTINE SCH PATCH: 14 PATCH, EXTENDED RELEASE TRANSDERMAL at 14:50

## 2022-03-31 VITALS — DIASTOLIC BLOOD PRESSURE: 80 MMHG | SYSTOLIC BLOOD PRESSURE: 136 MMHG

## 2022-03-31 VITALS — SYSTOLIC BLOOD PRESSURE: 103 MMHG | DIASTOLIC BLOOD PRESSURE: 62 MMHG

## 2022-03-31 LAB
CHOLEST SERPL-MCNC: 131 MG/DL (ref ?–200)
CHOLEST/HDLC SERPL: 2.38 {RATIO} (ref ?–5)
HDLC SERPL-MCNC: 55 MG/DL (ref 40–?)
LDLC SERPL CALC-MCNC: 38 MG/DL (ref ?–100)
NONHDLC SERPL-MCNC: 76 MG/DL
TRIGL SERPL-MCNC: 192 MG/DL (ref ?–150)

## 2022-03-31 RX ADMIN — NICOTINE SCH PATCH: 14 PATCH, EXTENDED RELEASE TRANSDERMAL at 09:39

## 2022-03-31 RX ADMIN — VENLAFAXINE HYDROCHLORIDE SCH MG: 37.5 CAPSULE, EXTENDED RELEASE ORAL at 09:39

## 2022-04-01 VITALS — SYSTOLIC BLOOD PRESSURE: 110 MMHG | DIASTOLIC BLOOD PRESSURE: 56 MMHG

## 2022-04-01 RX ADMIN — NICOTINE SCH PATCH: 14 PATCH, EXTENDED RELEASE TRANSDERMAL at 09:39

## 2022-04-01 RX ADMIN — VENLAFAXINE HYDROCHLORIDE SCH MG: 37.5 CAPSULE, EXTENDED RELEASE ORAL at 09:39

## 2022-08-25 ENCOUNTER — HOSPITAL ENCOUNTER (INPATIENT)
Dept: HOSPITAL 53 - M ED | Age: 37
LOS: 8 days | Discharge: HOME | DRG: 751 | End: 2022-09-02
Attending: PSYCHIATRY & NEUROLOGY | Admitting: PSYCHIATRY & NEUROLOGY
Payer: COMMERCIAL

## 2022-08-25 VITALS — BODY MASS INDEX: 22.55 KG/M2 | HEIGHT: 66 IN | WEIGHT: 140.3 LBS

## 2022-08-25 DIAGNOSIS — R45.851: ICD-10-CM

## 2022-08-25 DIAGNOSIS — F17.210: ICD-10-CM

## 2022-08-25 DIAGNOSIS — Z20.822: ICD-10-CM

## 2022-08-25 DIAGNOSIS — F60.89: ICD-10-CM

## 2022-08-25 DIAGNOSIS — Z79.899: ICD-10-CM

## 2022-08-25 DIAGNOSIS — F32.89: ICD-10-CM

## 2022-08-25 DIAGNOSIS — F29: Primary | ICD-10-CM

## 2022-08-25 LAB
ALBUMIN SERPL BCG-MCNC: 4.1 GM/DL (ref 3.2–5.2)
ALT SERPL W P-5'-P-CCNC: 17 U/L (ref 12–78)
AMPHETAMINES UR QL SCN: POSITIVE
APAP SERPL-MCNC: < 2 UG/ML (ref 10–30)
BARBITURATES UR QL SCN: NEGATIVE
BENZODIAZ UR QL SCN: NEGATIVE
BILIRUB CONJ SERPL-MCNC: 0.2 MG/DL (ref 0–0.2)
BILIRUB SERPL-MCNC: 0.4 MG/DL (ref 0.2–1)
BUN SERPL-MCNC: 16 MG/DL (ref 7–18)
BZE UR QL SCN: POSITIVE
CALCIUM SERPL-MCNC: 9.3 MG/DL (ref 8.5–10.1)
CANNABINOIDS UR QL SCN: POSITIVE
CHLORIDE SERPL-SCNC: 106 MEQ/L (ref 98–107)
CO2 SERPL-SCNC: 27 MEQ/L (ref 21–32)
CREAT SERPL-MCNC: 0.95 MG/DL (ref 0.7–1.3)
ETHANOL SERPL-MCNC: 0 % (ref 0–0.01)
GFR SERPL CREATININE-BSD FRML MDRD: > 60 ML/MIN/{1.73_M2} (ref 60–?)
GLUCOSE SERPL-MCNC: 83 MG/DL (ref 70–100)
HCT VFR BLD AUTO: 40.1 % (ref 42–52)
HGB BLD-MCNC: 13.6 G/DL (ref 13.5–17.5)
MCH RBC QN AUTO: 30.4 PG (ref 27–33)
MCHC RBC AUTO-ENTMCNC: 33.9 G/DL (ref 32–36.5)
MCV RBC AUTO: 89.7 FL (ref 80–96)
METHADONE UR QL SCN: NEGATIVE
OPIATES UR QL SCN: NEGATIVE
PCP UR QL SCN: NEGATIVE
PLATELET # BLD AUTO: 302 10^3/UL (ref 150–450)
POTASSIUM SERPL-SCNC: 3.8 MEQ/L (ref 3.5–5.1)
PROT SERPL-MCNC: 6.6 GM/DL (ref 6.4–8.2)
RBC # BLD AUTO: 4.47 10^6/UL (ref 4.3–6.1)
RSV RNA NPH QL NAA+PROBE: NEGATIVE
SALICYLATES SERPL-MCNC: < 1.7 MG/DL (ref 5–30)
SODIUM SERPL-SCNC: 139 MEQ/L (ref 136–145)
TSH SERPL DL<=0.005 MIU/L-ACNC: 1.04 UIU/ML (ref 0.36–3.74)
WBC # BLD AUTO: 11 10^3/UL (ref 4–10)

## 2022-08-26 VITALS — DIASTOLIC BLOOD PRESSURE: 84 MMHG | SYSTOLIC BLOOD PRESSURE: 124 MMHG

## 2022-08-27 VITALS — SYSTOLIC BLOOD PRESSURE: 106 MMHG | DIASTOLIC BLOOD PRESSURE: 72 MMHG

## 2022-08-27 RX ADMIN — CYCLOBENZAPRINE HYDROCHLORIDE PRN MG: 5 TABLET, FILM COATED ORAL at 14:25

## 2022-08-27 RX ADMIN — NICOTINE SCH PATCH: 14 PATCH, EXTENDED RELEASE TRANSDERMAL at 09:41

## 2022-08-28 VITALS — DIASTOLIC BLOOD PRESSURE: 62 MMHG | SYSTOLIC BLOOD PRESSURE: 110 MMHG

## 2022-08-28 VITALS — SYSTOLIC BLOOD PRESSURE: 146 MMHG | DIASTOLIC BLOOD PRESSURE: 88 MMHG

## 2022-08-28 RX ADMIN — NICOTINE SCH PATCH: 14 PATCH, EXTENDED RELEASE TRANSDERMAL at 09:00

## 2022-08-28 RX ADMIN — VENLAFAXINE HYDROCHLORIDE SCH MG: 37.5 CAPSULE, EXTENDED RELEASE ORAL at 08:59

## 2022-08-28 RX ADMIN — CYCLOBENZAPRINE HYDROCHLORIDE PRN MG: 5 TABLET, FILM COATED ORAL at 15:59

## 2022-08-29 VITALS — DIASTOLIC BLOOD PRESSURE: 69 MMHG | SYSTOLIC BLOOD PRESSURE: 118 MMHG

## 2022-08-29 VITALS — DIASTOLIC BLOOD PRESSURE: 60 MMHG | SYSTOLIC BLOOD PRESSURE: 102 MMHG

## 2022-08-29 LAB
CHOLEST SERPL-MCNC: 142 MG/DL (ref ?–200)
CHOLEST/HDLC SERPL: 2.54 {RATIO} (ref ?–5)
HDLC SERPL-MCNC: 56 MG/DL (ref 40–?)
NONHDLC SERPL-MCNC: 86 MG/DL
TRIGL SERPL-MCNC: 454 MG/DL (ref ?–150)

## 2022-08-29 RX ADMIN — CYCLOBENZAPRINE HYDROCHLORIDE PRN MG: 5 TABLET, FILM COATED ORAL at 12:59

## 2022-08-29 RX ADMIN — VENLAFAXINE HYDROCHLORIDE SCH MG: 37.5 CAPSULE, EXTENDED RELEASE ORAL at 09:33

## 2022-08-29 RX ADMIN — NICOTINE SCH PATCH: 14 PATCH, EXTENDED RELEASE TRANSDERMAL at 09:33

## 2022-08-30 VITALS — SYSTOLIC BLOOD PRESSURE: 107 MMHG | DIASTOLIC BLOOD PRESSURE: 60 MMHG

## 2022-08-30 VITALS — DIASTOLIC BLOOD PRESSURE: 64 MMHG | SYSTOLIC BLOOD PRESSURE: 105 MMHG

## 2022-08-30 RX ADMIN — RISPERIDONE SCH MG: 0.5 TABLET ORAL at 08:19

## 2022-08-30 RX ADMIN — VENLAFAXINE HYDROCHLORIDE SCH MG: 37.5 CAPSULE, EXTENDED RELEASE ORAL at 08:19

## 2022-08-30 RX ADMIN — NICOTINE SCH PATCH: 14 PATCH, EXTENDED RELEASE TRANSDERMAL at 08:19

## 2022-08-30 RX ADMIN — CYCLOBENZAPRINE HYDROCHLORIDE PRN MG: 5 TABLET, FILM COATED ORAL at 21:27

## 2022-08-31 VITALS — SYSTOLIC BLOOD PRESSURE: 133 MMHG | DIASTOLIC BLOOD PRESSURE: 60 MMHG

## 2022-08-31 VITALS — DIASTOLIC BLOOD PRESSURE: 78 MMHG | SYSTOLIC BLOOD PRESSURE: 135 MMHG

## 2022-08-31 RX ADMIN — RISPERIDONE SCH MG: 0.5 TABLET ORAL at 08:01

## 2022-08-31 RX ADMIN — NICOTINE SCH PATCH: 14 PATCH, EXTENDED RELEASE TRANSDERMAL at 08:01

## 2022-08-31 RX ADMIN — VENLAFAXINE HYDROCHLORIDE SCH MG: 37.5 CAPSULE, EXTENDED RELEASE ORAL at 08:01

## 2022-08-31 RX ADMIN — CYCLOBENZAPRINE HYDROCHLORIDE PRN MG: 5 TABLET, FILM COATED ORAL at 20:05

## 2022-09-01 VITALS — DIASTOLIC BLOOD PRESSURE: 72 MMHG | SYSTOLIC BLOOD PRESSURE: 106 MMHG

## 2022-09-01 VITALS — SYSTOLIC BLOOD PRESSURE: 131 MMHG | DIASTOLIC BLOOD PRESSURE: 69 MMHG

## 2022-09-01 RX ADMIN — RISPERIDONE SCH MG: 0.5 TABLET ORAL at 20:13

## 2022-09-01 RX ADMIN — VENLAFAXINE HYDROCHLORIDE SCH MG: 37.5 CAPSULE, EXTENDED RELEASE ORAL at 07:52

## 2022-09-01 RX ADMIN — DIPHENHYDRAMINE HYDROCHLORIDE PRN MG: 25 CAPSULE ORAL at 21:33

## 2022-09-01 RX ADMIN — NICOTINE SCH PATCH: 14 PATCH, EXTENDED RELEASE TRANSDERMAL at 07:53

## 2022-09-01 RX ADMIN — RISPERIDONE SCH MG: 0.5 TABLET ORAL at 07:52

## 2022-09-01 RX ADMIN — DIPHENHYDRAMINE HYDROCHLORIDE PRN MG: 25 CAPSULE ORAL at 12:29

## 2022-09-01 RX ADMIN — CYCLOBENZAPRINE HYDROCHLORIDE PRN MG: 5 TABLET, FILM COATED ORAL at 20:14

## 2022-09-02 VITALS — SYSTOLIC BLOOD PRESSURE: 115 MMHG | DIASTOLIC BLOOD PRESSURE: 63 MMHG

## 2022-09-02 RX ADMIN — VENLAFAXINE HYDROCHLORIDE SCH MG: 37.5 CAPSULE, EXTENDED RELEASE ORAL at 08:27

## 2022-09-02 RX ADMIN — NICOTINE SCH PATCH: 14 PATCH, EXTENDED RELEASE TRANSDERMAL at 08:28

## 2022-09-02 RX ADMIN — RISPERIDONE SCH MG: 0.5 TABLET ORAL at 08:27

## 2022-09-03 ENCOUNTER — HOSPITAL ENCOUNTER (EMERGENCY)
Dept: HOSPITAL 53 - M ED | Age: 37
LOS: 1 days | Discharge: HOME | End: 2022-09-04
Payer: COMMERCIAL

## 2022-09-03 DIAGNOSIS — F19.10: Primary | ICD-10-CM

## 2022-09-03 DIAGNOSIS — F14.10: ICD-10-CM

## 2022-09-03 DIAGNOSIS — F17.200: ICD-10-CM

## 2022-09-03 DIAGNOSIS — F31.9: ICD-10-CM

## 2022-09-03 DIAGNOSIS — Z79.899: ICD-10-CM

## 2022-09-03 LAB
ALBUMIN SERPL BCG-MCNC: 4.3 GM/DL (ref 3.2–5.2)
ALT SERPL W P-5'-P-CCNC: 58 U/L (ref 12–78)
AMPHETAMINES UR QL SCN: NEGATIVE
APAP SERPL-MCNC: 2.3 UG/ML (ref 10–30)
BARBITURATES UR QL SCN: NEGATIVE
BENZODIAZ UR QL SCN: NEGATIVE
BILIRUB CONJ SERPL-MCNC: 0.1 MG/DL (ref 0–0.2)
BILIRUB SERPL-MCNC: 0.4 MG/DL (ref 0.2–1)
BUN SERPL-MCNC: 15 MG/DL (ref 7–18)
BZE UR QL SCN: NEGATIVE
CALCIUM SERPL-MCNC: 9.3 MG/DL (ref 8.5–10.1)
CANNABINOIDS UR QL SCN: POSITIVE
CHLORIDE SERPL-SCNC: 108 MEQ/L (ref 98–107)
CO2 SERPL-SCNC: 27 MEQ/L (ref 21–32)
CREAT SERPL-MCNC: 1.01 MG/DL (ref 0.7–1.3)
ETHANOL SERPL-MCNC: < 0.003 % (ref 0–0.01)
GFR SERPL CREATININE-BSD FRML MDRD: > 60 ML/MIN/{1.73_M2} (ref 60–?)
GLUCOSE SERPL-MCNC: 92 MG/DL (ref 70–100)
HCT VFR BLD AUTO: 41.2 % (ref 42–52)
HGB BLD-MCNC: 14.2 G/DL (ref 13.5–17.5)
MCH RBC QN AUTO: 30.9 PG (ref 27–33)
MCHC RBC AUTO-ENTMCNC: 34.5 G/DL (ref 32–36.5)
MCV RBC AUTO: 89.6 FL (ref 80–96)
METHADONE UR QL SCN: NEGATIVE
OPIATES UR QL SCN: NEGATIVE
PCP UR QL SCN: NEGATIVE
PLATELET # BLD AUTO: 313 10^3/UL (ref 150–450)
POTASSIUM SERPL-SCNC: 4.1 MEQ/L (ref 3.5–5.1)
PROT SERPL-MCNC: 7.2 GM/DL (ref 6.4–8.2)
RBC # BLD AUTO: 4.6 10^6/UL (ref 4.3–6.1)
RSV RNA NPH QL NAA+PROBE: NEGATIVE
SALICYLATES SERPL-MCNC: < 1.7 MG/DL (ref 5–30)
SODIUM SERPL-SCNC: 141 MEQ/L (ref 136–145)
TSH SERPL DL<=0.005 MIU/L-ACNC: 2.5 UIU/ML (ref 0.36–3.74)
WBC # BLD AUTO: 11 10^3/UL (ref 4–10)

## 2022-09-03 RX ADMIN — RISPERIDONE SCH MG: 0.5 TABLET ORAL at 20:49

## 2022-09-04 VITALS — SYSTOLIC BLOOD PRESSURE: 109 MMHG | DIASTOLIC BLOOD PRESSURE: 70 MMHG

## 2022-09-04 RX ADMIN — RISPERIDONE SCH MG: 0.5 TABLET ORAL at 08:22

## 2022-12-13 ENCOUNTER — HOSPITAL ENCOUNTER (INPATIENT)
Dept: HOSPITAL 53 - M ED | Age: 37
LOS: 7 days | Discharge: HOME | DRG: 751 | End: 2022-12-20
Attending: STUDENT IN AN ORGANIZED HEALTH CARE EDUCATION/TRAINING PROGRAM | Admitting: STUDENT IN AN ORGANIZED HEALTH CARE EDUCATION/TRAINING PROGRAM
Payer: COMMERCIAL

## 2022-12-13 VITALS — BODY MASS INDEX: 22.55 KG/M2 | HEIGHT: 66 IN | WEIGHT: 140.3 LBS

## 2022-12-13 DIAGNOSIS — F15.90: ICD-10-CM

## 2022-12-13 DIAGNOSIS — F31.9: ICD-10-CM

## 2022-12-13 DIAGNOSIS — F12.90: ICD-10-CM

## 2022-12-13 DIAGNOSIS — F90.9: ICD-10-CM

## 2022-12-13 DIAGNOSIS — Z59.00: ICD-10-CM

## 2022-12-13 DIAGNOSIS — Z79.899: ICD-10-CM

## 2022-12-13 DIAGNOSIS — F29: Primary | ICD-10-CM

## 2022-12-13 DIAGNOSIS — F17.200: ICD-10-CM

## 2022-12-13 DIAGNOSIS — F60.89: ICD-10-CM

## 2022-12-13 LAB
ALBUMIN SERPL BCG-MCNC: 4.3 G/DL (ref 3.2–5.2)
ALP SERPL-CCNC: 111 U/L (ref 46–116)
ALT SERPL W P-5'-P-CCNC: 34 U/L (ref 7–40)
AMPHETAMINES UR QL SCN: POSITIVE
APAP SERPL-MCNC: < 2 UG/ML (ref 10–20)
AST SERPL-CCNC: 37 U/L (ref ?–34)
BARBITURATES UR QL SCN: NEGATIVE
BENZODIAZ UR QL SCN: NEGATIVE
BILIRUB CONJ SERPL-MCNC: 0.6 MG/DL (ref ?–0.4)
BILIRUB SERPL-MCNC: 1.3 MG/DL (ref 0.3–1.2)
BUN SERPL-MCNC: 19 MG/DL (ref 9–23)
BZE UR QL SCN: NEGATIVE
CALCIUM SERPL-MCNC: 10 MG/DL (ref 8.5–10.1)
CANNABINOIDS UR QL SCN: POSITIVE
CHLORIDE SERPL-SCNC: 108 MMOL/L (ref 98–107)
CO2 SERPL-SCNC: 25 MMOL/L (ref 20–31)
CREAT SERPL-MCNC: 1.05 MG/DL (ref 0.7–1.3)
ETHANOL SERPL-MCNC: 0 % (ref 0–0.01)
GFR SERPL CREATININE-BSD FRML MDRD: > 60 ML/MIN/{1.73_M2} (ref 60–?)
GLUCOSE SERPL-MCNC: 89 MG/DL (ref 60–100)
HCT VFR BLD AUTO: 43.8 % (ref 42–52)
HGB BLD-MCNC: 15.2 G/DL (ref 13.5–17.5)
MCH RBC QN AUTO: 29.8 PG (ref 27–33)
MCHC RBC AUTO-ENTMCNC: 34.7 G/DL (ref 32–36.5)
MCV RBC AUTO: 85.9 FL (ref 80–96)
METHADONE UR QL SCN: NEGATIVE
OPIATES UR QL SCN: POSITIVE
PCP UR QL SCN: NEGATIVE
PLATELET # BLD AUTO: 417 10^3/UL (ref 150–450)
POTASSIUM SERPL-SCNC: 3.9 MMOL/L (ref 3.5–5.1)
PROT SERPL-MCNC: 7.5 G/DL (ref 5.7–8.2)
RBC # BLD AUTO: 5.1 10^6/UL (ref 4.3–6.1)
RSV RNA NPH QL NAA+PROBE: NEGATIVE
SALICYLATES SERPL-MCNC: < 3 MG/DL (ref ?–30)
SODIUM SERPL-SCNC: 139 MMOL/L (ref 136–145)
TSH SERPL DL<=0.005 MIU/L-ACNC: 1.05 UIU/ML (ref 0.55–4.78)
WBC # BLD AUTO: 11.2 10^3/UL (ref 4–10)

## 2022-12-13 SDOH — ECONOMIC STABILITY - HOUSING INSECURITY: HOMELESSNESS UNSPECIFIED: Z59.00

## 2022-12-14 VITALS — SYSTOLIC BLOOD PRESSURE: 103 MMHG | DIASTOLIC BLOOD PRESSURE: 72 MMHG

## 2022-12-14 VITALS — SYSTOLIC BLOOD PRESSURE: 132 MMHG | DIASTOLIC BLOOD PRESSURE: 64 MMHG

## 2022-12-14 RX ADMIN — RISPERIDONE SCH MG: 0.5 TABLET ORAL at 20:06

## 2022-12-14 RX ADMIN — VENLAFAXINE HYDROCHLORIDE SCH MG: 37.5 CAPSULE, EXTENDED RELEASE ORAL at 16:11

## 2022-12-15 VITALS — SYSTOLIC BLOOD PRESSURE: 145 MMHG | DIASTOLIC BLOOD PRESSURE: 72 MMHG

## 2022-12-15 VITALS — DIASTOLIC BLOOD PRESSURE: 70 MMHG | SYSTOLIC BLOOD PRESSURE: 103 MMHG

## 2022-12-15 RX ADMIN — VENLAFAXINE HYDROCHLORIDE SCH MG: 37.5 CAPSULE, EXTENDED RELEASE ORAL at 09:45

## 2022-12-15 RX ADMIN — RISPERIDONE SCH MG: 0.5 TABLET ORAL at 21:19

## 2022-12-15 RX ADMIN — NICOTINE SCH PATCH: 21 PATCH, EXTENDED RELEASE TRANSDERMAL at 09:00

## 2022-12-15 RX ADMIN — RISPERIDONE SCH MG: 0.5 TABLET ORAL at 09:45

## 2022-12-16 VITALS — SYSTOLIC BLOOD PRESSURE: 105 MMHG | DIASTOLIC BLOOD PRESSURE: 65 MMHG

## 2022-12-16 VITALS — DIASTOLIC BLOOD PRESSURE: 85 MMHG | SYSTOLIC BLOOD PRESSURE: 141 MMHG

## 2022-12-16 RX ADMIN — VENLAFAXINE HYDROCHLORIDE SCH MG: 37.5 CAPSULE, EXTENDED RELEASE ORAL at 09:28

## 2022-12-16 RX ADMIN — NICOTINE SCH PATCH: 21 PATCH, EXTENDED RELEASE TRANSDERMAL at 09:00

## 2022-12-16 RX ADMIN — RISPERIDONE SCH MG: 0.5 TABLET ORAL at 09:28

## 2022-12-17 VITALS — DIASTOLIC BLOOD PRESSURE: 73 MMHG | SYSTOLIC BLOOD PRESSURE: 102 MMHG

## 2022-12-17 VITALS — SYSTOLIC BLOOD PRESSURE: 101 MMHG | DIASTOLIC BLOOD PRESSURE: 66 MMHG

## 2022-12-17 RX ADMIN — NICOTINE SCH PATCH: 21 PATCH, EXTENDED RELEASE TRANSDERMAL at 09:00

## 2022-12-17 RX ADMIN — VENLAFAXINE HYDROCHLORIDE SCH MG: 37.5 CAPSULE, EXTENDED RELEASE ORAL at 09:42

## 2022-12-18 VITALS — DIASTOLIC BLOOD PRESSURE: 68 MMHG | SYSTOLIC BLOOD PRESSURE: 116 MMHG

## 2022-12-18 VITALS — DIASTOLIC BLOOD PRESSURE: 63 MMHG | SYSTOLIC BLOOD PRESSURE: 101 MMHG

## 2022-12-18 RX ADMIN — NICOTINE SCH PATCH: 21 PATCH, EXTENDED RELEASE TRANSDERMAL at 10:11

## 2022-12-18 RX ADMIN — VENLAFAXINE HYDROCHLORIDE SCH MG: 37.5 CAPSULE, EXTENDED RELEASE ORAL at 08:42

## 2022-12-18 RX ADMIN — NICOTINE SCH PATCH: 21 PATCH, EXTENDED RELEASE TRANSDERMAL at 08:41

## 2022-12-19 VITALS — SYSTOLIC BLOOD PRESSURE: 110 MMHG | DIASTOLIC BLOOD PRESSURE: 53 MMHG

## 2022-12-19 VITALS — DIASTOLIC BLOOD PRESSURE: 62 MMHG | SYSTOLIC BLOOD PRESSURE: 106 MMHG

## 2022-12-19 RX ADMIN — VENLAFAXINE HYDROCHLORIDE SCH MG: 37.5 CAPSULE, EXTENDED RELEASE ORAL at 08:55

## 2022-12-19 RX ADMIN — NICOTINE SCH PATCH: 21 PATCH, EXTENDED RELEASE TRANSDERMAL at 08:55

## 2022-12-20 VITALS — DIASTOLIC BLOOD PRESSURE: 58 MMHG | SYSTOLIC BLOOD PRESSURE: 110 MMHG

## 2022-12-20 RX ADMIN — VENLAFAXINE HYDROCHLORIDE SCH MG: 37.5 CAPSULE, EXTENDED RELEASE ORAL at 08:07

## 2022-12-20 RX ADMIN — NICOTINE SCH PATCH: 21 PATCH, EXTENDED RELEASE TRANSDERMAL at 08:06

## 2023-03-30 ENCOUNTER — HOSPITAL ENCOUNTER (INPATIENT)
Dept: HOSPITAL 53 - M ED | Age: 38
LOS: 6 days | Discharge: HOME | DRG: 751 | End: 2023-04-05
Attending: STUDENT IN AN ORGANIZED HEALTH CARE EDUCATION/TRAINING PROGRAM | Admitting: PSYCHIATRY & NEUROLOGY
Payer: COMMERCIAL

## 2023-03-30 VITALS — BODY MASS INDEX: 22.55 KG/M2 | WEIGHT: 140.3 LBS | HEIGHT: 66 IN

## 2023-03-30 DIAGNOSIS — F14.10: ICD-10-CM

## 2023-03-30 DIAGNOSIS — R45.851: ICD-10-CM

## 2023-03-30 DIAGNOSIS — F12.10: ICD-10-CM

## 2023-03-30 DIAGNOSIS — K21.9: ICD-10-CM

## 2023-03-30 DIAGNOSIS — Z59.00: ICD-10-CM

## 2023-03-30 DIAGNOSIS — F17.200: ICD-10-CM

## 2023-03-30 DIAGNOSIS — F15.10: ICD-10-CM

## 2023-03-30 DIAGNOSIS — F29: Primary | ICD-10-CM

## 2023-03-30 DIAGNOSIS — F60.89: ICD-10-CM

## 2023-03-30 DIAGNOSIS — F11.10: ICD-10-CM

## 2023-03-30 LAB
ALBUMIN SERPL BCG-MCNC: 4.8 G/DL (ref 3.2–5.2)
ALP SERPL-CCNC: 114 U/L (ref 46–116)
ALT SERPL W P-5'-P-CCNC: 22 U/L (ref 7–40)
AMPHETAMINES UR QL SCN: POSITIVE
APAP SERPL-MCNC: < 2 UG/ML (ref 10–20)
AST SERPL-CCNC: 16 U/L (ref ?–34)
BARBITURATES UR QL SCN: NEGATIVE
BENZODIAZ UR QL SCN: NEGATIVE
BILIRUB CONJ SERPL-MCNC: 0.4 MG/DL (ref ?–0.4)
BILIRUB SERPL-MCNC: 0.8 MG/DL (ref 0.3–1.2)
BUN SERPL-MCNC: 12 MG/DL (ref 9–23)
BZE UR QL SCN: POSITIVE
CALCIUM SERPL-MCNC: 10.1 MG/DL (ref 8.5–10.1)
CANNABINOIDS UR QL SCN: POSITIVE
CHLORIDE SERPL-SCNC: 106 MMOL/L (ref 98–107)
CO2 SERPL-SCNC: 24 MMOL/L (ref 20–31)
CREAT SERPL-MCNC: 0.79 MG/DL (ref 0.7–1.3)
ETHANOL SERPL-MCNC: 0 % (ref 0–0.01)
GFR SERPL CREATININE-BSD FRML MDRD: > 60 ML/MIN/{1.73_M2} (ref 60–?)
GLUCOSE SERPL-MCNC: 97 MG/DL (ref 60–100)
HCT VFR BLD AUTO: 47.3 % (ref 42–52)
HGB BLD-MCNC: 16.1 G/DL (ref 13.5–17.5)
MCH RBC QN AUTO: 29.7 PG (ref 27–33)
MCHC RBC AUTO-ENTMCNC: 34 G/DL (ref 32–36.5)
MCV RBC AUTO: 87.3 FL (ref 80–96)
METHADONE UR QL SCN: NEGATIVE
OPIATES UR QL SCN: POSITIVE
PCP UR QL SCN: NEGATIVE
PLATELET # BLD AUTO: 400 10^3/UL (ref 150–450)
POTASSIUM SERPL-SCNC: 4 MMOL/L (ref 3.5–5.1)
PROT SERPL-MCNC: 7.9 G/DL (ref 5.7–8.2)
RBC # BLD AUTO: 5.42 10^6/UL (ref 4.3–6.1)
SALICYLATES SERPL-MCNC: < 3 MG/DL (ref ?–30)
SODIUM SERPL-SCNC: 140 MMOL/L (ref 136–145)
TSH SERPL DL<=0.005 MIU/L-ACNC: 1.19 UIU/ML (ref 0.55–4.78)
WBC # BLD AUTO: 12.8 10^3/UL (ref 4–10)

## 2023-03-30 SDOH — ECONOMIC STABILITY - HOUSING INSECURITY: HOMELESSNESS UNSPECIFIED: Z59.00

## 2023-03-31 VITALS — DIASTOLIC BLOOD PRESSURE: 71 MMHG | SYSTOLIC BLOOD PRESSURE: 107 MMHG

## 2023-04-01 VITALS — SYSTOLIC BLOOD PRESSURE: 119 MMHG | DIASTOLIC BLOOD PRESSURE: 75 MMHG

## 2023-04-01 VITALS — SYSTOLIC BLOOD PRESSURE: 103 MMHG | DIASTOLIC BLOOD PRESSURE: 65 MMHG

## 2023-04-01 RX ADMIN — VENLAFAXINE HYDROCHLORIDE SCH MG: 75 CAPSULE, EXTENDED RELEASE ORAL at 12:07

## 2023-04-01 RX ADMIN — NICOTINE SCH PATCH: 21 PATCH, EXTENDED RELEASE TRANSDERMAL at 09:00

## 2023-04-02 VITALS — SYSTOLIC BLOOD PRESSURE: 113 MMHG | DIASTOLIC BLOOD PRESSURE: 73 MMHG

## 2023-04-02 VITALS — SYSTOLIC BLOOD PRESSURE: 119 MMHG | DIASTOLIC BLOOD PRESSURE: 76 MMHG

## 2023-04-02 RX ADMIN — NICOTINE SCH PATCH: 21 PATCH, EXTENDED RELEASE TRANSDERMAL at 09:43

## 2023-04-02 RX ADMIN — VENLAFAXINE HYDROCHLORIDE SCH MG: 75 CAPSULE, EXTENDED RELEASE ORAL at 09:32

## 2023-04-03 VITALS — DIASTOLIC BLOOD PRESSURE: 69 MMHG | SYSTOLIC BLOOD PRESSURE: 105 MMHG

## 2023-04-03 VITALS — DIASTOLIC BLOOD PRESSURE: 58 MMHG | SYSTOLIC BLOOD PRESSURE: 109 MMHG

## 2023-04-03 RX ADMIN — VENLAFAXINE HYDROCHLORIDE SCH MG: 75 CAPSULE, EXTENDED RELEASE ORAL at 10:05

## 2023-04-03 RX ADMIN — NICOTINE SCH PATCH: 21 PATCH, EXTENDED RELEASE TRANSDERMAL at 10:07

## 2023-04-04 VITALS — DIASTOLIC BLOOD PRESSURE: 56 MMHG | SYSTOLIC BLOOD PRESSURE: 106 MMHG

## 2023-04-04 VITALS — SYSTOLIC BLOOD PRESSURE: 106 MMHG | DIASTOLIC BLOOD PRESSURE: 56 MMHG

## 2023-04-04 VITALS — SYSTOLIC BLOOD PRESSURE: 121 MMHG | DIASTOLIC BLOOD PRESSURE: 72 MMHG

## 2023-04-04 RX ADMIN — VENLAFAXINE HYDROCHLORIDE SCH MG: 75 CAPSULE, EXTENDED RELEASE ORAL at 10:19

## 2023-04-04 RX ADMIN — NICOTINE SCH PATCH: 21 PATCH, EXTENDED RELEASE TRANSDERMAL at 10:18

## 2023-04-05 VITALS — SYSTOLIC BLOOD PRESSURE: 101 MMHG | DIASTOLIC BLOOD PRESSURE: 57 MMHG

## 2023-04-05 RX ADMIN — NICOTINE SCH PATCH: 21 PATCH, EXTENDED RELEASE TRANSDERMAL at 09:00

## 2023-04-05 RX ADMIN — VENLAFAXINE HYDROCHLORIDE SCH MG: 75 CAPSULE, EXTENDED RELEASE ORAL at 09:25

## 2023-04-06 ENCOUNTER — HOSPITAL ENCOUNTER (EMERGENCY)
Dept: HOSPITAL 53 - M ED | Age: 38
Discharge: HOME | End: 2023-04-06
Payer: COMMERCIAL

## 2023-04-06 VITALS — BODY MASS INDEX: 22.53 KG/M2 | HEIGHT: 66 IN | WEIGHT: 140.21 LBS

## 2023-04-06 VITALS — DIASTOLIC BLOOD PRESSURE: 107 MMHG | SYSTOLIC BLOOD PRESSURE: 146 MMHG

## 2023-04-06 DIAGNOSIS — Z59.00: Primary | ICD-10-CM

## 2023-04-06 DIAGNOSIS — F19.10: ICD-10-CM

## 2023-04-06 DIAGNOSIS — F90.9: ICD-10-CM

## 2023-04-06 DIAGNOSIS — F17.200: ICD-10-CM

## 2023-04-06 DIAGNOSIS — Z76.5: ICD-10-CM

## 2023-04-06 LAB
ALBUMIN SERPL BCG-MCNC: 4.4 G/DL (ref 3.2–5.2)
ALP SERPL-CCNC: 102 U/L (ref 46–116)
ALT SERPL W P-5'-P-CCNC: 17 U/L (ref 7–40)
AMPHETAMINES UR QL SCN: NEGATIVE
APAP SERPL-MCNC: < 2 UG/ML (ref 10–20)
AST SERPL-CCNC: 12 U/L (ref ?–34)
BARBITURATES UR QL SCN: NEGATIVE
BENZODIAZ UR QL SCN: NEGATIVE
BILIRUB CONJ SERPL-MCNC: 0.2 MG/DL (ref ?–0.4)
BILIRUB SERPL-MCNC: 0.5 MG/DL (ref 0.3–1.2)
BUN SERPL-MCNC: 11 MG/DL (ref 9–23)
BZE UR QL SCN: POSITIVE
CALCIUM SERPL-MCNC: 9.7 MG/DL (ref 8.5–10.1)
CANNABINOIDS UR QL SCN: POSITIVE
CHLORIDE SERPL-SCNC: 106 MMOL/L (ref 98–107)
CO2 SERPL-SCNC: 27 MMOL/L (ref 20–31)
CREAT SERPL-MCNC: 0.75 MG/DL (ref 0.7–1.3)
ETHANOL SERPL-MCNC: < 0.003 % (ref 0–0.01)
GFR SERPL CREATININE-BSD FRML MDRD: > 60 ML/MIN/{1.73_M2} (ref 60–?)
GLUCOSE SERPL-MCNC: 97 MG/DL (ref 60–100)
HCT VFR BLD AUTO: 45 % (ref 42–52)
HGB BLD-MCNC: 15.4 G/DL (ref 13.5–17.5)
MCH RBC QN AUTO: 29.9 PG (ref 27–33)
MCHC RBC AUTO-ENTMCNC: 34.2 G/DL (ref 32–36.5)
MCV RBC AUTO: 87.4 FL (ref 80–96)
METHADONE UR QL SCN: NEGATIVE
OPIATES UR QL SCN: NEGATIVE
PCP UR QL SCN: NEGATIVE
PLATELET # BLD AUTO: 377 10^3/UL (ref 150–450)
POTASSIUM SERPL-SCNC: 4 MMOL/L (ref 3.5–5.1)
PROT SERPL-MCNC: 7.2 G/DL (ref 5.7–8.2)
RBC # BLD AUTO: 5.15 10^6/UL (ref 4.3–6.1)
SALICYLATES SERPL-MCNC: < 3 MG/DL (ref ?–30)
SODIUM SERPL-SCNC: 140 MMOL/L (ref 136–145)
TSH SERPL DL<=0.005 MIU/L-ACNC: 1.47 UIU/ML (ref 0.55–4.78)
WBC # BLD AUTO: 9.8 10^3/UL (ref 4–10)

## 2023-04-06 SDOH — ECONOMIC STABILITY - HOUSING INSECURITY: HOMELESSNESS UNSPECIFIED: Z59.00

## 2024-12-16 ENCOUNTER — HOSPITAL ENCOUNTER (EMERGENCY)
Dept: HOSPITAL 53 - M ED | Age: 39
Discharge: HOME | End: 2024-12-16
Payer: COMMERCIAL

## 2024-12-16 VITALS — SYSTOLIC BLOOD PRESSURE: 108 MMHG | DIASTOLIC BLOOD PRESSURE: 81 MMHG | TEMPERATURE: 97.5 F | OXYGEN SATURATION: 97 %

## 2024-12-16 VITALS — WEIGHT: 150.31 LBS | HEIGHT: 66 IN | BODY MASS INDEX: 24.16 KG/M2

## 2024-12-16 DIAGNOSIS — F17.200: ICD-10-CM

## 2024-12-16 DIAGNOSIS — K52.9: Primary | ICD-10-CM

## 2024-12-16 DIAGNOSIS — Z79.899: ICD-10-CM

## 2024-12-16 PROCEDURE — 99284 EMERGENCY DEPT VISIT MOD MDM: CPT

## 2025-02-26 ENCOUNTER — HOSPITAL ENCOUNTER (EMERGENCY)
Dept: HOSPITAL 53 - M ED | Age: 40
LOS: 1 days | Discharge: HOME | End: 2025-02-27
Payer: SELF-PAY

## 2025-02-26 VITALS — BODY MASS INDEX: 21.83 KG/M2 | HEIGHT: 66 IN | WEIGHT: 135.8 LBS

## 2025-02-26 DIAGNOSIS — F14.10: ICD-10-CM

## 2025-02-26 DIAGNOSIS — F17.200: ICD-10-CM

## 2025-02-26 DIAGNOSIS — Z79.899: ICD-10-CM

## 2025-02-26 DIAGNOSIS — J18.9: Primary | ICD-10-CM

## 2025-02-27 VITALS — DIASTOLIC BLOOD PRESSURE: 72 MMHG | OXYGEN SATURATION: 96 % | TEMPERATURE: 99.3 F | SYSTOLIC BLOOD PRESSURE: 109 MMHG

## 2025-02-27 RX ADMIN — ACETAMINOPHEN ONE MG: 325 TABLET ORAL at 08:39

## 2025-02-27 RX ADMIN — IPRATROPIUM BROMIDE AND ALBUTEROL SULFATE ONE ML: .5; 3 SOLUTION RESPIRATORY (INHALATION) at 08:58

## 2025-02-27 RX ADMIN — BENZONATATE ONE MG: 100 CAPSULE ORAL at 08:58

## 2025-02-27 RX ADMIN — DOXYCYCLINE HYCLATE ONE MG: 100 TABLET, COATED ORAL at 08:58

## 2025-07-12 ENCOUNTER — HOSPITAL ENCOUNTER (EMERGENCY)
Dept: HOSPITAL 53 - M ED | Age: 40
Discharge: HOME | End: 2025-07-12
Payer: COMMERCIAL

## 2025-07-12 VITALS — OXYGEN SATURATION: 96 % | DIASTOLIC BLOOD PRESSURE: 79 MMHG | TEMPERATURE: 98 F | SYSTOLIC BLOOD PRESSURE: 112 MMHG

## 2025-07-12 DIAGNOSIS — Z79.52: ICD-10-CM

## 2025-07-12 DIAGNOSIS — F19.10: ICD-10-CM

## 2025-07-12 DIAGNOSIS — F10.10: ICD-10-CM

## 2025-07-12 DIAGNOSIS — Z79.899: ICD-10-CM

## 2025-07-12 DIAGNOSIS — F23: Primary | ICD-10-CM

## 2025-07-12 DIAGNOSIS — F31.9: ICD-10-CM

## 2025-07-12 DIAGNOSIS — Z79.83: ICD-10-CM

## 2025-07-12 LAB
ALBUMIN SERPL BCG-MCNC: 5.1 G/DL (ref 3.2–5.2)
ALP SERPL-CCNC: 102 U/L (ref 40–129)
ALT SERPL W P-5'-P-CCNC: 28 U/L (ref 7–40)
AMPHETAMINES UR QL SCN: POSITIVE
AST SERPL-CCNC: 58 U/L (ref ?–34)
BARBITURATES UR QL SCN: NEGATIVE
BENZODIAZ UR QL SCN: NEGATIVE
BILIRUB CONJ SERPL-MCNC: 0.6 MG/DL (ref ?–0.4)
BILIRUB SERPL-MCNC: 1.5 MG/DL (ref 0.3–1.2)
BUN SERPL-MCNC: 47 MG/DL (ref 9–23)
BZE UR QL SCN: POSITIVE
CALCIUM SERPL-MCNC: 9.4 MG/DL (ref 8.5–10.1)
CANNABINOIDS UR QL SCN: POSITIVE
CHLORIDE SERPL-SCNC: 100 MMOL/L (ref 98–107)
CO2 SERPL-SCNC: 20 MMOL/L (ref 20–31)
CREAT SERPL-MCNC: 1.56 MG/DL (ref 0.7–1.3)
ETHANOL SERPL-MCNC: < 0.003 % (ref 0–0.01)
GFR SERPL CREATININE-BSD FRML MDRD: 56.9 ML/MIN/{1.73_M2} (ref 60–?)
GLUCOSE SERPL-MCNC: 112 MG/DL (ref 60–100)
HCT VFR BLD AUTO: 40 % (ref 42–52)
HGB BLD-MCNC: 13.8 G/DL (ref 13.5–17.5)
MCH RBC QN AUTO: 30.1 PG (ref 27–33)
MCHC RBC AUTO-ENTMCNC: 34.5 G/DL (ref 32–36.5)
MCV RBC AUTO: 87.1 FL (ref 80–96)
METHADONE UR QL SCN: NEGATIVE
OPIATES UR QL SCN: NEGATIVE
PCP UR QL SCN: NEGATIVE
PLATELET # BLD AUTO: 317 10^3/UL (ref 150–450)
POTASSIUM SERPL-SCNC: 4.2 MMOL/L (ref 3.5–5.1)
PROT SERPL-MCNC: 7.5 G/DL (ref 5.7–8.2)
RBC # BLD AUTO: 4.59 10^6/UL (ref 4.3–6.1)
SALICYLATES SERPL-MCNC: < 3 MG/DL (ref ?–30)
SODIUM SERPL-SCNC: 141 MMOL/L (ref 136–145)
TSH SERPL DL<=0.005 MIU/L-ACNC: 0.85 UIU/ML (ref 0.55–4.78)
WBC # BLD AUTO: 16.7 10^3/UL (ref 4–10)

## 2025-07-12 RX ADMIN — ACETAMINOPHEN ONE MG: 325 TABLET ORAL at 14:27
